# Patient Record
(demographics unavailable — no encounter records)

---

## 2025-03-17 NOTE — REASON FOR VISIT
[FreeTextEntry1] : Cardiology follow-up visit for evaluation management of chest pain and left arm discomfort.  He has history of coronary artery disease (mild CAD identified on coronary CTA 2021 with mid LAD bridge), hypertension and abnormal EKG.

## 2025-03-17 NOTE — DISCUSSION/SUMMARY
[FreeTextEntry1] : 73-year-old man presents for follow-up visit due to concern for left-sided chest pain with radiation down his left arm after drinking coffee.  Associated with palpitations. He has had cardiac workup in the past including coronary CTA that demonstrated minimal coronary artery disease with mid LAD bridge. On physical examination, blood pressure stable.  Appears euvolemic.  No new cardiac murmurs rubs or gallops are noted. EKG is normal sinus rhythm, right bundle branch block, nonspecific ST changes. Further evaluation is indicated.  Plan 1.  Current medication list is reviewed.  Reasonable to add beta-blocker, start metoprolol succinate 25 mg daily. 2.  Extended Holter monitor placed today to evaluate for possible underlying arrhythmia. 3.  Echocardiogram to exclude structural heart disease. 4.  Pharmacologic nuclear stress test to evaluate for possible underlying ischemic heart disease. 5.  Lifestyle modifications including minimizing alcohol intake, reducing caloric intake and eating a heart healthy diet was reviewed. 5.  Further cardiac recommendations pending above workup. 6.  The above was reviewed with the patient all of his questions been answered to his satisfaction. [EKG obtained to assist in diagnosis and management of assessed problem(s)] : EKG obtained to assist in diagnosis and management of assessed problem(s)

## 2025-03-17 NOTE — CARDIOLOGY SUMMARY
[de-identified] : March 17, 2025.  Sinus Rhythm  -Right bundle branch block. - nonspecific ST changes  ABNORMAL [de-identified] : March 15, 2021. Coronary calcium score 23. Minimal CAD . Myocardial bridge of mid LAD

## 2025-03-17 NOTE — HISTORY OF PRESENT ILLNESS
[FreeTextEntry1] : He was last seen in the office in 2023. He notes that over the past couple of weeks, he has been noting left-sided chest pain that radiates down his left arm.  The symptoms started after he has morning coffee.  Symptoms in the chest last for a few minutes but then in the arm can last for about 20 minutes.  They resolve on their own. Chest pain and radiation to the left arm is not associated with effort or exertion.  There is no associated shortness of breath or diaphoresis.  He thinks that maybe he has a sense of palpitations and his heart is beating fast. No syncope or near syncope.  No edema.  No orthopnea.  No PND. He had been going to his gym to exercise on a regular basis but recently stopped. He has been prescribed steroids for a rash. Current medications: Zetia 10 mg daily, levothyroxine 150 mg daily, lisinopril 40 mg daily and pravastatin 40 mg daily. He has daily wine with dinner.

## 2025-07-29 NOTE — REASON FOR VISIT
[FreeTextEntry1] : Cardiology follow-up visit for evaluation and management of nonobstructive coronary artery disease, hypertension and abnormal EKG.

## 2025-07-29 NOTE — CARDIOLOGY SUMMARY
[de-identified] : July 29 2025.  Sinus Rhythm  -Right bundle branch block. ABNORMAL [de-identified] : April 22, 2025.  Normal exercise myocardial perfusion stress test. [de-identified] : April 11, 2025.  Technically difficult study.  Normal left ventricular systolic function, no significant valvular pericardial disease. [de-identified] : March 15, 2021. Coronary calcium score 23. Minimal CAD . Myocardial bridge of mid LAD

## 2025-07-29 NOTE — HISTORY OF PRESENT ILLNESS
[FreeTextEntry1] : Since last visit with me, patient endorses feeling well. He has been playing golf and going for regular walks and denies any cardiopulmonary limitations. No complaints of chest pain or chest pressure.  No shortness of breath or dyspnea on exertion.  He has minimized caffeine intake and denies any recent episodes of palpitations.  No dizziness, lightheadedness, syncope or near syncope.  No edema.  No orthopnea.  No PND.

## 2025-07-29 NOTE — DISCUSSION/SUMMARY
[FreeTextEntry1] : 73-year-old man with nonobstructive coronary artery disease, hypertension and abnormal EKG. He has a good functional capacity has been asymptomatic with respect to cardiac issues. On physical examination, blood pressure is stable.  Appears euvolemic.  No new cardiac murmurs rubs or gallops are noted. EKG is sinus rhythm, right bundle branch block. Noninvasive cardiac testing including echocardiogram and nuclear stress test, as noted above was favorable. The current cardiac status appears to be stable.  Plan 1.  Current medications are reviewed, no changes. 2.  He will continue with lisinopril and Pravachol/Zetia. 3.  Lifestyle changes including weight loss, eating a heart healthy diet and continue to maintain a moderately active lifestyle with regular moderate intensity exercise was discussed with him. 4.  Follow-up in the office in 6 months. 5.  The above was reviewed with the patient and all questions are been answered to satisfaction. [EKG obtained to assist in diagnosis and management of assessed problem(s)] : EKG obtained to assist in diagnosis and management of assessed problem(s)